# Patient Record
Sex: MALE | Race: WHITE | Employment: OTHER | URBAN - METROPOLITAN AREA
[De-identification: names, ages, dates, MRNs, and addresses within clinical notes are randomized per-mention and may not be internally consistent; named-entity substitution may affect disease eponyms.]

---

## 2017-01-20 ENCOUNTER — HOSPITAL ENCOUNTER (OUTPATIENT)
Dept: ENDOSCOPY | Age: 62
Discharge: HOME | End: 2017-01-20
Attending: FAMILY MEDICINE | Admitting: FAMILY MEDICINE

## 2017-10-13 ENCOUNTER — OFFICE VISIT (OUTPATIENT)
Dept: ORTHOPEDIC SURGERY | Age: 62
End: 2017-10-13

## 2017-10-13 ENCOUNTER — HOSPITAL ENCOUNTER (OUTPATIENT)
Dept: PHYSICAL THERAPY | Age: 62
Discharge: HOME | End: 2017-10-31
Admitting: ORTHOPAEDIC SURGERY

## 2017-10-13 VITALS
DIASTOLIC BLOOD PRESSURE: 79 MMHG | WEIGHT: 248.02 LBS | BODY MASS INDEX: 34.72 KG/M2 | HEART RATE: 75 BPM | HEIGHT: 71 IN | SYSTOLIC BLOOD PRESSURE: 124 MMHG

## 2017-10-13 DIAGNOSIS — M76.32 IT BAND SYNDROME, LEFT: ICD-10-CM

## 2017-10-13 DIAGNOSIS — M17.11 PRIMARY OSTEOARTHRITIS OF RIGHT KNEE: ICD-10-CM

## 2017-10-13 DIAGNOSIS — M21.161 ACQUIRED VARUS DEFORMITY KNEE, RIGHT: ICD-10-CM

## 2017-10-13 DIAGNOSIS — Z96.652 STATUS POST TOTAL LEFT KNEE REPLACEMENT: Primary | ICD-10-CM

## 2017-10-13 PROBLEM — M21.169 ACQUIRED VARUS DEFORMITY KNEE: Status: ACTIVE | Noted: 2017-10-13

## 2017-10-13 PROCEDURE — 73560 X-RAY EXAM OF KNEE 1 OR 2: CPT | Performed by: ORTHOPAEDIC SURGERY

## 2017-10-13 PROCEDURE — 99214 OFFICE O/P EST MOD 30 MIN: CPT | Performed by: ORTHOPAEDIC SURGERY

## 2017-10-13 PROCEDURE — 73564 X-RAY EXAM KNEE 4 OR MORE: CPT | Performed by: ORTHOPAEDIC SURGERY

## 2017-10-13 PROCEDURE — 20610 DRAIN/INJ JOINT/BURSA W/O US: CPT | Performed by: ORTHOPAEDIC SURGERY

## 2017-10-13 RX ORDER — METHYLPREDNISOLONE ACETATE 80 MG/ML
80 INJECTION, SUSPENSION INTRA-ARTICULAR; INTRALESIONAL; INTRAMUSCULAR; SOFT TISSUE ONCE
Status: SHIPPED | OUTPATIENT
Start: 2017-10-13

## 2017-10-13 NOTE — PROGRESS NOTES
Depomedrol     NDC#: 9824-1686-26  Lot: [id number]  Expiration Date: 2/20  Dose: 4cc  Location: injection was given in Left  knee            Lido    NDC#: 8753-9622-55   Lot: -[id number]  Expiration Date: 1/19   Dose: 2cc  Location: injection was given in Left knee

## 2017-10-13 NOTE — PROGRESS NOTES
History of Present Illness:  Garrick Cuenca is a 65 y.o. male    Chief Complaint      Knee Pain (Old patient NP B knee pain Left TKR on 2012 Right ACL sx 15 years ago )  . He comes in today for evaluation of problems of both left and right knee. He notes that he had a hip replacement done on the left side approximately 9 months ago and since that time his left knee has been getting somewhat sore on the outside. He also had assistant arthroscopy done on the right knee about 15 years ago. He says that knee is getting mildly uncomfortable    Brief summary of past history and reason for visit[de-identified]  He's here for evaluation of both left and right knee left knee. He says his little pop, which is uncomfortable, right knee has some pain. He notes his pain as a 7 out of 10 but is actually walking quite comfortably and has limited gait abnormality           Medical History:  Patient's medications, allergies, past medical, surgical, social and family histories were reviewed and updated as appropriate. Social History     Social History    Marital status:      Spouse name: N/A    Number of children: N/A    Years of education: N/A     Occupational History    Not on file. Social History Main Topics    Smoking status: Former Smoker    Smokeless tobacco: Not on file      Comment: quit 14 yrs. ago (25 yr. hx)    Alcohol use Not on file      Comment: occassional    Drug use: Unknown    Sexual activity: Not on file     Other Topics Concern    Not on file     Social History Narrative    No narrative on file       No Known Allergies  Current Outpatient Prescriptions on File Prior to Visit   Medication Sig Dispense Refill    celecoxib (CELEBREX) 200 MG capsule Take 200 mg by mouth daily.  Ascorbic Acid (VITAMIN C) 500 MG tablet Take 500 mg by mouth daily. No current facility-administered medications on file prior to visit. family history is not on file.   Past Medical History: Diagnosis Date    Arthritis     knees    Club Foot     bilat. Past Medical History:   Diagnosis Date    Arthritis     knees    Club Foot     bilat. Active Ambulatory Problems     Diagnosis Date Noted    Osteoarthritis of left knee 04/24/2012    Arthritis     Club Foot     Acquired varus deformity knee 10/13/2017     Resolved Ambulatory Problems     Diagnosis Date Noted    No Resolved Ambulatory Problems     Past Medical History:   Diagnosis Date    Arthritis     Club Foot        Review of Systems:  Salome Costello reported review of systems has been reviewed and has been scanned into his medical record for today's visit. The scanned image can be found in media images folder. He was instructed to contact his primary care physician regarding ROS positives if not already being addressed during today's visit. Height: 5' 10.87\" (180 cm), Weight: 248 lb 0.3 oz (112.5 kg), Body mass index is 34.72 kg/m².,  Relevant review of systems reviewed and available in the patient's chart. All pertinent systems are reviewed and positives noted. ,  Vital Signs:  Vitals:    10/13/17 0958   BP: 124/79   Pulse: 75     Height: 5' 10.87\" (180 cm), Weight: 248 lb 0.3 oz (112.5 kg), Body mass index is 34.72 kg/m². Knee Exam:     Overall alignment is appreciated. Examination today shows bilateral neutral alignment. Left knee is neutral right knee showing early varus pathology            Localization and area of pain:   His left knee pain is along the outer aspect is right knee medial      Effusion ; he has a bilateral effusion of the right knee F2 plus left knee, 1+      Palpation:  Palpation left knee shows IT band discomfort to direct pressure. Right knee has medial tibial femoral crepitation        Range of Motion:    Full range of motion of both knees is appreciated      Ligamentous Stability and meniscus pathology  .   There is no gross instability in either knee, but the right knee has varus

## 2017-10-19 ENCOUNTER — HOSPITAL ENCOUNTER (OUTPATIENT)
Dept: PHYSICAL THERAPY | Age: 62
Discharge: HOME | End: 2017-10-20
Admitting: ORTHOPAEDIC SURGERY

## 2017-10-26 ENCOUNTER — HOSPITAL ENCOUNTER (OUTPATIENT)
Dept: PHYSICAL THERAPY | Age: 62
Discharge: HOME | End: 2017-10-26
Admitting: ORTHOPAEDIC SURGERY

## 2017-10-26 NOTE — FLOWSHEET NOTE
10/19    Posture: WNL   10/19    Gait: (include devices/WB status) Trendelenburg on left; decreased knee flexion on right with ER of LE  10/19                       [x] Patient history, allergies, meds reviewed. Medical chart reviewed. See intake form. 10/19    Review Of Systems (ROS):  [x]Performed Review of systems (Integumentary, CardioPulmonary, Neurological) by intake and observation. Intake form has been scanned into medical record. Patient has been instructed to contact their primary care physician regarding ROS issues if not already being addressed at this time.   10/19      RESTRICTIONS/PRECAUTIONS: L FLORIAN; L TKA    Exercises/Interventions:   Exercise/Equipment Resistance/Repetitions Other comments   Stretching     Hamstring 30 sec x 5  Start 10/19   Towel Pull     Inclined Calf     Hip Flexion     ITB 30 sec x 5 Start 10/19   Groin     Quad 30 sec x 5 Prone; start 10/19   Crossbody stertch 30 sec x 5 Start 10/19   Seated Hip ER 30 sec x 5 Start 10/19        SLR     Supine 3 x 10 Start 10/26   Abduction 3 x 10 Start 10/26   Adduction     Prone     SLR+          Isometrics     Quad sets          Patellar Glides     Medial     Superior     Inferior          ROM     Sheet Pulls     Hang Weights     Passive     Active     Weight Shift     Ankle Pumps                    CKC     Calf raises     Wall sits     Step ups     1 leg stand     Squatting     CC TKE     Balance     bridges     clamshells 3 x 10 Blue band; start 10/26   PRE     Extension 3 x 10  RANGE: start 10/26   Flexion  RANGE:        Quantum machines     Leg press      Leg extension     Leg curl          Manual interventions     Rolling ITB w/ tiger tail 5 min Start 10/26              Therapeutic Exercise and NMR EXR  [x] (39307) Provided verbal/tactile cueing for activities related to strengthening, flexibility, endurance, ROM for improvements in LE, proximal hip, and core control with self care, mobility, lifting, ambulation.  [] (47579) Provided 30 minutes face-to-face)  [] EVAL (HIGH) 12547 (typically 45 minutes face-to-face)  [] RE-EVAL   [x] VD(55963) x  2   [] IONTO  [] NMR (72498) x      [] VASO  [] Manual (48816) x       [] Other:  [] TA x       [] Mech Traction (78004)  [] ES(attended) (68634)      [] ES (un) (82715):     GOALS:   Patient stated goal: Play golf with no pain    Therapist goals for Patient:   Short Term Goals: To be achieved in: 2 weeks  1. Independent in HEP and progression per patient tolerance, in order to prevent re-injury. 2. Patient will have a decrease in pain to facilitate improvement in movement, function, and ADLs as indicated by Functional Deficits. Long Term Goals: To be achieved in: 4-6 weeks  1. Disability index score of 20% or less for the LEFS to assist with reaching prior level of function in 6 weeks. 2. Patient will demonstrate increased AROM to [facility] to allow for proper joint functioning as indicated by patients Functional Deficits in 4 weeks. 3. Patient will demonstrate an increase in Strength to good proximal hip strength and control in LE to allow for proper functional mobility as indicated by patients Functional Deficits in 6 weeks. 4. Patient will return to Independent functional activities without increased symptoms or restriction in 6 weeks. 5. Patient will report no increase in pain with playing golf in 6 weeks. Progression Towards Functional goals:  [x] Patient is progressing as expected towards functional goals listed. [] Progression is slowed due to complexities listed. [] Progression has been slowed due to co-morbidities.   [] Plan just implemented, too soon to assess goals progression  [] Other:     ASSESSMENT:  See eval    Treatment/Activity Tolerance:  [x] Patient tolerated treatment well [] Patient limited by fatique  [] Patient limited by pain  [] Patient limited by other medical complications  [x] Other: 10/26 Patient tolerated treatment well this session with no complaints of pain. Patient presents with increased TTP at distal insertion of ITB with increased muscular tightness noted proximally; good tolerance to tiger tail this session. Continue to progress as tolerated.        Patient education:  10/19 HEP provided and reviewed    Prognosis: [x] Good [] Fair  [] Poor    Patient Requires Follow-up: [x] Yes  [] No    PLAN: See eval  [x] Continue per plan of care [] Alter current plan (see comments)  [x] Plan of care initiated [] Hold pending MD visit [] Discharge    Electronically signed by: Sandra Dukes PT, DPT

## 2017-11-01 ENCOUNTER — HOSPITAL ENCOUNTER (OUTPATIENT)
Dept: PHYSICAL THERAPY | Age: 62
Discharge: HOME | End: 2017-11-30
Attending: ORTHOPAEDIC SURGERY | Admitting: ORTHOPAEDIC SURGERY

## 2017-11-02 ENCOUNTER — HOSPITAL ENCOUNTER (OUTPATIENT)
Dept: PHYSICAL THERAPY | Age: 62
Discharge: HOME | End: 2017-11-02
Admitting: ORTHOPAEDIC SURGERY

## 2017-11-02 NOTE — FLOWSHEET NOTE
lifting, ambulation.  [] (76548) Provided verbal/tactile cueing for activities related to improving balance, coordination, kinesthetic sense, posture, motor skill, proprioception  to assist with LE, proximal hip, and core control in self care, mobility, lifting, ambulation and eccentric single leg control. NMR and Therapeutic Activities:    [] (47968 or 22210) Provided verbal/tactile cueing for activities related to improving balance, coordination, kinesthetic sense, posture, motor skill, proprioception and motor activation to allow for proper function of core, proximal hip and LE with self care and ADLs  [] (74376) Gait Re-education- Provided training and instruction to the patient for proper LE, core and proximal hip recruitment and positioning and eccentric body weight control with ambulation re-education including up and down stairs     Home Exercise Program:    [x] (62977) Reviewed/Progressed HEP activities related to strengthening, flexibility, endurance, ROM of core, proximal hip and LE for functional self-care, mobility, lifting and ambulation/stair navigation   [] (27543)Reviewed/Progressed HEP activities related to improving balance, coordination, kinesthetic sense, posture, motor skill, proprioception of core, proximal hip and LE for self care, mobility, lifting, and ambulation/stair navigation      Manual Treatments:  PROM / STM / Oscillations-Mobs:  G-I, II, III, IV (PA's, Inf., Post.)  [] (49334) Provided manual therapy to mobilize LE, proximal hip and/or LS spine soft tissue/joints for the purpose of modulating pain, promoting relaxation,  increasing ROM, reducing/eliminating soft tissue swelling/inflammation/restriction, improving soft tissue extensibility and allowing for proper ROM for normal function with self care, mobility, lifting and ambulation.      Modalities:      Charges:  Timed Code Treatment Minutes: 45   Total Treatment Minutes: 20  1:06-1:57       [] EVAL (LOW) 75968 (typically 20

## 2017-11-16 ENCOUNTER — HOSPITAL ENCOUNTER (OUTPATIENT)
Dept: PHYSICAL THERAPY | Age: 62
Discharge: HOME | End: 2017-11-16
Admitting: ORTHOPAEDIC SURGERY

## 2017-11-16 NOTE — FLOWSHEET NOTE
[facility] [employer]  Orthopaedics and Sports Rehabilitation, [facility]    Physical Therapy Daily Treatment Note  Date:  2017    Patient Name:  Ashlyn Blue    :  1955  MRN: [id number]  Restrictions/Precautions:    Medical/Treatment Diagnosis Information:  Diagnosis: 76.32 (ICD-10-CM) - It band syndrome, left; Z96.652 (ICD-10-CM) - Status post total left knee replacement; M17.11 (ICD-10-CM) - Primary osteoarthritis of right knee  Treatment Diagnosis: 76.32 (ICD-10-CM) - It band syndrome, left  Insurance/Certification information:  PT Insurance Information: [facility]  Physician Information:  Referring Practitioner: Gerard Tobar  Plan of care signed (Y/N):     Date of Patient follow up with Physician:     G-Code (if applicable):      Date G-Code Applied:         Progress Note: [x]  Yes  []  No  Next due by: Visit #10 or 11/10/17     Latex Allergy:  [x]NO      []YES  Preferred Language for Healthcare:   [x]English       []other:    Visit # Insurance Allowable   4   30     Pain level:  3-4/10   11/16      SUBJECTIVE:   Patient states that he feels about the same.      OBJECTIVE:   Flexibility  10/19 L R Comment   Hamstring 90/90: 45 degrees      Gastroc Moderate restriction Moderate to severe restriction    ITB Tera's: positive Tera's: negative    Quad Moderate restriction Moderate restriction            ROM  10/19 PROM AROM Overpressure Comment    L R L R L R    Flexion   105       Extension   0       Hip IR 25 45        Hip ER  45 45            Strength  10/19 L R Comment   Quad 5 5    Hamstring 5 5    Gastroc 5 5    Hip  flexion 4+ 5    Hip abd 4+ 4+    Hip IR 4+ 5    Hip ER 5 5        Reflexes/Sensation: 10/19   [x]Dermatomes/Myotomes intact    []Reflexes equal and normal bilaterally   []Other:    Joint mobility: 10/19    []Normal    [x]Hypo   []Hyper    Palpation: Tenderness at distal insertion of ITB   10/19    Functional Mobility/Transfers: Independent with ADL's and IADL's with increase pain  10/19    Posture: WNL   10/19    Gait: (include devices/WB status) Trendelenburg on left; decreased knee flexion on right with ER of LE  10/19                       [x] Patient history, allergies, meds reviewed. Medical chart reviewed. See intake form. 10/19    Review Of Systems (ROS):  [x]Performed Review of systems (Integumentary, CardioPulmonary, Neurological) by intake and observation. Intake form has been scanned into medical record. Patient has been instructed to contact their primary care physician regarding ROS issues if not already being addressed at this time.   10/19      RESTRICTIONS/PRECAUTIONS: L FLORIAN; L TKA    Exercises/Interventions:   Exercise/Equipment Resistance/Repetitions Other comments   Stretching     Hamstring 30 sec x 5  Start 10/19   Towel Pull     Inclined Calf     Hip Flexion     ITB 30 sec x 5 Start 10/19   Groin     Quad 30 sec x 5 Prone; start 10/19   Crossbody stretch 30 sec x 5 Start 10/19   Seated Hip ER 30 sec x 5 Start 10/19        SLR     Supine 3 x 10; 1# ^11/16   Abduction 3 x 10; 1# ^11/16   Adduction 3 x 10; 1# ^11/16   Prone 3 x 10; 1# ^11/16   SLR+          Isometrics     Quad sets          Patellar Glides     Medial     Superior     Inferior          ROM     Sheet Pulls     Hang Weights     Passive     Active     Weight Shift     Ankle Pumps                    CKC     Calf raises     Wall sits     Step ups     1 leg stand     Squatting     CC TKE     Balance     bridges     clamshells 3 x 10 silver band; ^11/2   PRE     RANGE: start 10/26   Flexion  RANGE:        Quantum machines     Leg press  3 x 10; 110# Start 11/16   Leg extension 3 x 10; 35# Start 11/16   Leg curl 3 x 10; 35 Start 11/16        Manual interventions     Rolling ITB w/ tiger tail 8 min Start 10/26              Therapeutic Exercise and NMR EXR  [x] (02557) Provided verbal/tactile cueing for activities related to strengthening, flexibility, endurance, ROM for improvements in LE, proximal hip, and core control with self care, mobility, lifting, ambulation.  [] (62940) Provided verbal/tactile cueing for activities related to improving balance, coordination, kinesthetic sense, posture, motor skill, proprioception  to assist with LE, proximal hip, and core control in self care, mobility, lifting, ambulation and eccentric single leg control. NMR and Therapeutic Activities:    [] (87911 or 31354) Provided verbal/tactile cueing for activities related to improving balance, coordination, kinesthetic sense, posture, motor skill, proprioception and motor activation to allow for proper function of core, proximal hip and LE with self care and ADLs  [] (67891) Gait Re-education- Provided training and instruction to the patient for proper LE, core and proximal hip recruitment and positioning and eccentric body weight control with ambulation re-education including up and down stairs     Home Exercise Program:    [x] (47523) Reviewed/Progressed HEP activities related to strengthening, flexibility, endurance, ROM of core, proximal hip and LE for functional self-care, mobility, lifting and ambulation/stair navigation   [] (52106)Reviewed/Progressed HEP activities related to improving balance, coordination, kinesthetic sense, posture, motor skill, proprioception of core, proximal hip and LE for self care, mobility, lifting, and ambulation/stair navigation      Manual Treatments:  PROM / STM / Oscillations-Mobs:  G-I, II, III, IV (PA's, Inf., Post.)  [] (78627) Provided manual therapy to mobilize LE, proximal hip and/or LS spine soft tissue/joints for the purpose of modulating pain, promoting relaxation,  increasing ROM, reducing/eliminating soft tissue swelling/inflammation/restriction, improving soft tissue extensibility and allowing for proper ROM for normal function with self care, mobility, lifting and ambulation.      Modalities:      Charges:  Timed Code Treatment Minutes: 45   Total Treatment Minutes: 55  1:30-2:25 [] EVAL (LOW) 94904 (typically 20 minutes face-to-face)  [] EVAL (MOD) 92044 (typically 30 minutes face-to-face)  [] EVAL (HIGH) 82781 (typically 45 minutes face-to-face)  [] RE-EVAL   [x] NW(18743) x  1   [] IONTO  [] NMR (75447) x      [] VASO  [x] Manual (63855) x  1    [] Other:  [x] TA x  1    [] Mech Traction (77992)  [] ES(attended) (60078)      [] ES (un) (05318):     GOALS:   Patient stated goal: Play golf with no pain    Therapist goals for Patient:   Short Term Goals: To be achieved in: 2 weeks  1. Independent in HEP and progression per patient tolerance, in order to prevent re-injury. 2. Patient will have a decrease in pain to facilitate improvement in movement, function, and ADLs as indicated by Functional Deficits. Long Term Goals: To be achieved in: 4-6 weeks  1. Disability index score of 20% or less for the LEFS to assist with reaching prior level of function in 6 weeks. 2. Patient will demonstrate increased AROM to [facility] to allow for proper joint functioning as indicated by patients Functional Deficits in 4 weeks. 3. Patient will demonstrate an increase in Strength to good proximal hip strength and control in LE to allow for proper functional mobility as indicated by patients Functional Deficits in 6 weeks. 4. Patient will return to Independent functional activities without increased symptoms or restriction in 6 weeks. 5. Patient will report no increase in pain with playing golf in 6 weeks. Progression Towards Functional goals:  [x] Patient is progressing as expected towards functional goals listed. [] Progression is slowed due to complexities listed. [] Progression has been slowed due to co-morbidities.   [] Plan just implemented, too soon to assess goals progression  [] Other:     ASSESSMENT:  See eval    Treatment/Activity Tolerance:  [x] Patient tolerated treatment well [] Patient limited by fatique  [] Patient limited by pain  [] Patient limited by other medical complications  [x] Other: 11/16 Patient tolerated treatment well this session with no reports of pain. Patient reports getting minimal relief with rolling Lateral Thigh. Patient interested in Dn. Will consult MD about potential treatment option.       Patient education:  10/19 HEP provided and reviewed    Prognosis: [x] Good [] Fair  [] Poor    Patient Requires Follow-up: [x] Yes  [] No    PLAN: See eval  [x] Continue per plan of care [] Alter current plan (see comments)  [] Plan of care initiated [] Hold pending MD visit [] Discharge    Electronically signed by: Chang Quezada PT, DPT

## 2017-11-27 ENCOUNTER — OFFICE VISIT (OUTPATIENT)
Dept: ORTHOPEDIC SURGERY | Age: 62
End: 2017-11-27

## 2017-11-27 VITALS
HEART RATE: 75 BPM | SYSTOLIC BLOOD PRESSURE: 118 MMHG | WEIGHT: 248 LBS | BODY MASS INDEX: 35.5 KG/M2 | HEIGHT: 70 IN | DIASTOLIC BLOOD PRESSURE: 81 MMHG

## 2017-11-27 DIAGNOSIS — Z96.649 S/P REVISION OF TOTAL HIP: Primary | ICD-10-CM

## 2017-11-27 DIAGNOSIS — M76.32 IT BAND SYNDROME, LEFT: ICD-10-CM

## 2017-11-27 DIAGNOSIS — Z96.652 STATUS POST TOTAL LEFT KNEE REPLACEMENT: ICD-10-CM

## 2017-11-27 PROCEDURE — G8427 DOCREV CUR MEDS BY ELIG CLIN: HCPCS | Performed by: ORTHOPAEDIC SURGERY

## 2017-11-27 PROCEDURE — 73502 X-RAY EXAM HIP UNI 2-3 VIEWS: CPT | Performed by: ORTHOPAEDIC SURGERY

## 2017-11-27 PROCEDURE — G8417 CALC BMI ABV UP PARAM F/U: HCPCS | Performed by: ORTHOPAEDIC SURGERY

## 2017-11-27 PROCEDURE — 1036F TOBACCO NON-USER: CPT | Performed by: ORTHOPAEDIC SURGERY

## 2017-11-27 PROCEDURE — G8484 FLU IMMUNIZE NO ADMIN: HCPCS | Performed by: ORTHOPAEDIC SURGERY

## 2017-11-27 PROCEDURE — 99213 OFFICE O/P EST LOW 20 MIN: CPT | Performed by: ORTHOPAEDIC SURGERY

## 2017-11-27 PROCEDURE — 3017F COLORECTAL CA SCREEN DOC REV: CPT | Performed by: ORTHOPAEDIC SURGERY

## 2017-11-27 NOTE — PROGRESS NOTES
History of Present Illness:    Garrick Cuenca is a 65 y.o. male  . He comes in today for follow-up evaluation concerning issues associated with his affected left knee. At last visit he was found to have IT band discomfort and an injection was given. He said that significantly help. He's been getting stretching and also massage, exercise and releases in therapy and improving. He notes his pain today is a 4 out of 10 but it's gotten much better  Chief Complaint , a summary of previous treatments, injury, and current reason for the visit:    He did have a left total knee replacement performed approximately 5 years ago. This is doing quite well until he had his hip replaced approximately 5 months ago. Since that time his knee is been sore and he ascribes to his knee issue. He's been told he has an IT band tendinitis, which was injected    Examination:    Examination today confirms some mild discomfort along the left IT band. He has point discomfort lateral epicondylar region and flexion-extension. He points directly to the area. The IT band as it goes over the femoral epicondylar region. He has no instability. He has no swelling. He has no pain. He says it's improved with the last visit at the injection site. He has been doing his physical therapy    Office Procedures:  Orders Placed This Encounter   Procedures    XR HIP LEFT (2-3 VIEWS)         X-ray interpretation:  X-rays:    Two-view x-rays of his left hip. These are performed. Because of his prior left hip replacement, now giving him some IT band pain. Examination the x-ray confirms a well aligned left hip. There is clear evidence of some lateralization of the femoral component, which would be consistent now with IT band tightness and discomfort in the IT band at the knee    Treatment Plan: At this point.   I've explained to him his x-ray findings on his hip along with the discomfort and pain relating to the hip to the knee.    Continued stretching and rehabilitation therapy protocols are discussed. For continued pain and repeat injection. If pain persists after improvement, then surgery may be necessary    Final impresson and disposition: Left IT band tendinopathy, status post left total hip with lateralization of the femoral component. Injection with improvement noted. Repeat injection for continued pain. Ultimately may require an IT band release                      Tamy Harris. Mel Scales M.D. This dictation was performed with a verbal recognition program and it was checked for errors. It is possible that there are still dictated errors within this office note. Any errors should be brought immediately to my attention for correction.   All efforts were made to ensure that this office note is accurate

## 2017-12-01 ENCOUNTER — HOSPITAL ENCOUNTER (OUTPATIENT)
Dept: PHYSICAL THERAPY | Age: 62
Discharge: HOME | End: 2017-12-31
Attending: ORTHOPAEDIC SURGERY | Admitting: ORTHOPAEDIC SURGERY

## 2020-05-19 ENCOUNTER — OFFICE VISIT (OUTPATIENT)
Dept: ORTHOPEDIC SURGERY | Age: 65
End: 2020-05-19
Payer: COMMERCIAL

## 2020-05-19 VITALS
HEIGHT: 70 IN | BODY MASS INDEX: 35.51 KG/M2 | TEMPERATURE: 98.2 F | SYSTOLIC BLOOD PRESSURE: 128 MMHG | HEART RATE: 69 BPM | WEIGHT: 248.02 LBS | DIASTOLIC BLOOD PRESSURE: 74 MMHG

## 2020-05-19 PROCEDURE — 99203 OFFICE O/P NEW LOW 30 MIN: CPT | Performed by: ORTHOPAEDIC SURGERY

## 2020-05-19 PROCEDURE — G8417 CALC BMI ABV UP PARAM F/U: HCPCS | Performed by: ORTHOPAEDIC SURGERY

## 2020-05-19 PROCEDURE — 20610 DRAIN/INJ JOINT/BURSA W/O US: CPT | Performed by: ORTHOPAEDIC SURGERY

## 2020-05-19 PROCEDURE — 1036F TOBACCO NON-USER: CPT | Performed by: ORTHOPAEDIC SURGERY

## 2020-05-19 PROCEDURE — 3017F COLORECTAL CA SCREEN DOC REV: CPT | Performed by: ORTHOPAEDIC SURGERY

## 2020-05-19 PROCEDURE — G8427 DOCREV CUR MEDS BY ELIG CLIN: HCPCS | Performed by: ORTHOPAEDIC SURGERY

## 2020-05-19 RX ORDER — METHYLPREDNISOLONE ACETATE 40 MG/ML
40 INJECTION, SUSPENSION INTRA-ARTICULAR; INTRALESIONAL; INTRAMUSCULAR; SOFT TISSUE ONCE
Status: COMPLETED | OUTPATIENT
Start: 2020-05-19 | End: 2020-05-19

## 2020-05-19 RX ADMIN — METHYLPREDNISOLONE ACETATE 40 MG: 40 INJECTION, SUSPENSION INTRA-ARTICULAR; INTRALESIONAL; INTRAMUSCULAR; SOFT TISSUE at 10:43

## 2020-05-19 ASSESSMENT — ENCOUNTER SYMPTOMS
GASTROINTESTINAL NEGATIVE: 1
ALLERGIC/IMMUNOLOGIC NEGATIVE: 1
EYES NEGATIVE: 1
RESPIRATORY NEGATIVE: 1

## 2020-09-15 ENCOUNTER — OFFICE VISIT (OUTPATIENT)
Dept: ORTHOPEDIC SURGERY | Age: 65
End: 2020-09-15
Payer: COMMERCIAL

## 2020-09-15 VITALS — WEIGHT: 248.02 LBS | HEIGHT: 70 IN | TEMPERATURE: 97.9 F | BODY MASS INDEX: 35.51 KG/M2

## 2020-09-15 PROCEDURE — 3017F COLORECTAL CA SCREEN DOC REV: CPT | Performed by: ORTHOPAEDIC SURGERY

## 2020-09-15 PROCEDURE — 1036F TOBACCO NON-USER: CPT | Performed by: ORTHOPAEDIC SURGERY

## 2020-09-15 PROCEDURE — G8427 DOCREV CUR MEDS BY ELIG CLIN: HCPCS | Performed by: ORTHOPAEDIC SURGERY

## 2020-09-15 PROCEDURE — 99213 OFFICE O/P EST LOW 20 MIN: CPT | Performed by: ORTHOPAEDIC SURGERY

## 2020-09-15 PROCEDURE — 20610 DRAIN/INJ JOINT/BURSA W/O US: CPT | Performed by: ORTHOPAEDIC SURGERY

## 2020-09-15 PROCEDURE — G8417 CALC BMI ABV UP PARAM F/U: HCPCS | Performed by: ORTHOPAEDIC SURGERY

## 2020-09-15 RX ORDER — METHYLPREDNISOLONE ACETATE 40 MG/ML
40 INJECTION, SUSPENSION INTRA-ARTICULAR; INTRALESIONAL; INTRAMUSCULAR; SOFT TISSUE ONCE
Status: COMPLETED | OUTPATIENT
Start: 2020-09-15 | End: 2020-09-15

## 2020-09-15 RX ADMIN — METHYLPREDNISOLONE ACETATE 40 MG: 40 INJECTION, SUSPENSION INTRA-ARTICULAR; INTRALESIONAL; INTRAMUSCULAR; SOFT TISSUE at 11:52

## 2020-09-15 ASSESSMENT — ENCOUNTER SYMPTOMS
COUGH: 0
SHORTNESS OF BREATH: 0

## 2020-09-15 NOTE — PROGRESS NOTES
[facility]  History and Physical  Knee Pain    Date:  9/15/2020    Name:  Justo Aguilar  Address:  [address] [place] [id number]    :  1955      Age:   60 y.o.    SSN:  [id number]      Medical Record Number:  [id number]      Chief Complaint    Follow-up (right knee, requesting cortisone injection )      History of Present Illness:  Justo Aguilar is a 60 y.o. male who presents for follow-up of his right knee. Patient has a history of severe DJD of the right knee which is been managed with injections. He was last seen on 2020 at which point he was given a cortisone injection into the right knee. He got about 4 to 5 months of relief from the injection and is here for repeat. He denies any new injury or progressive symptoms within the knee. He is still taking Celebrex which does seem to help him somewhat. He has been in communication with his ankle surgeon and is likely going for an ankle fusion to set him up for the possibility of a total knee arthroplasty in the future. His walking tolerance is about 35 minutes. The patient denies any new injury. The patient denies any clicking, popping, catching, giving way, joint locking, numbness, paresthesias, or weakness. Pain Assessment  Location of Pain: Knee  Location Modifiers: Right  Severity of Pain: 4  Quality of Pain: Dull, Aching  Duration of Pain: Persistent  Frequency of Pain: Constant  Aggravating Factors: Other (Comment)(n/a)  Relieving Factors: Other (Comment)(n/a)  Result of Injury: No  Work-Related Injury: No  Are there other pain locations you wish to document?: No    Past Medical History:   Diagnosis Date    Arthritis     knees    Club Foot     bilat.         Past Surgical History:   Procedure Laterality Date    FOOT SURGERY      x 5 as child for club feet, bilateral    KNEE ARTHROSCOPY      right ACL repair    KNEE SURGERY      for cyst, right    KNEE SURGERY 04/24/2012     LEFT TOTAL KNEE ARTHROPLASTY                No family history on file. Social History     Socioeconomic History    Marital status:      Spouse name: None    Number of children: None    Years of education: None    Highest education level: None   Occupational History    None   Social Needs    Financial resource strain: None    Food insecurity     Worry: None     Inability: None    Transportation needs     Medical: None     Non-medical: None   Tobacco Use    Smoking status: Former Smoker    Smokeless tobacco: Never Used    Tobacco comment: quit 14 yrs. ago (25 yr. hx)   Substance and Sexual Activity    Alcohol use: None     Comment: occassional    Drug use: None    Sexual activity: None   Lifestyle    Physical activity     Days per week: None     Minutes per session: None    Stress: None   Relationships    Social connections     Talks on phone: None     Gets together: None     Attends Sabianist service: None     Active member of club or organization: None     Attends meetings of clubs or organizations: None     Relationship status: None    Intimate partner violence     Fear of current or ex partner: None     Emotionally abused: None     Physically abused: None     Forced sexual activity: None   Other Topics Concern    None   Social History Narrative    None       Current Outpatient Medications   Medication Sig Dispense Refill    celecoxib (CELEBREX) 200 MG capsule Take 200 mg by mouth daily.  Ascorbic Acid (VITAMIN C) 500 MG tablet Take 500 mg by mouth daily.          Current Facility-Administered Medications   Medication Dose Route Frequency Provider Last Rate Last Dose    methylPREDNISolone acetate (DEPO-MEDROL) injection 80 mg  80 mg Intra-articular Once Deepali Hunt MD           No Known Allergies      Review of Systems:  A 14 point review of systems was completed by the patient and is available in the media section of the scanned medical record and was reviewed on 9/15/2020. The review is negative with the exception of those things mentioned in the HPI and Past Medical History   Review of Systems   Constitutional: Negative for activity change and fever. Respiratory: Negative for cough and shortness of breath. Vital Signs:   Temp 97.9 °F (36.6 °C) (Infrared)   Ht 5' 10\" (1.778 m)   Wt 248 lb 0.3 oz (112.5 kg)   BMI 35.59 kg/m²     General Exam:    General: Francois Alex is a healthy and well appearing 60y.o. year old male who is sitting comfortably in our office in no acute distress. Neuro: Alert & Oriented x 3,  normal,  no focal deficits noted. Normal mood & affect. Eyes: Sclera clear  Ears: Normal external ear  Mouth:  No perioral lesions  Pulm: Respirations unlabored and regular   Skin: Warm, well perfused      Knee Examination: Right    Inspection: Obvious clubfoot deformity with severe ankle osteoarthritis. No effusion, ecchymosis, discoloration, erythema, excessive warmth. no gross deformities, no signs of infection. Palpation: There is mild patellofemoral crepitus, there is moderate medial and lateral joint line tenderness. No other osseous or soft tissue tenderness around the knee. Negative calf tenderness    Range of Motion:  0-100 degrees without pain or difficulty    Strength:  5/5 quadriceps, 5/5 hamstrings    Special Tests:  No ligament instability, valgus and varus stress test are stable at 0 and 30°. Lachman's exhibits a solid endpoint. Negative posterior drawer.   Negative Homans sign    Gait: Slow, limping gait without assistive device    Alignment: Mild varus deformity    Neuro: Sensation equal bilateral lower extremities    Vascular: 2+ posterior tibialis pulse    Radiology:     No new imaging obtained today      Office Procedures:  Orders Placed This Encounter   Procedures    AR ARTHROCENTESIS ASPIR&/INJ MAJOR JT/BURSA W/O US            Assessment: Patient is a 60 y.o. male severe tricompartmental DJD of the right knee    Visit Diagnoses       Codes    Right knee pain, unspecified chronicity    -  Primary M25.561    Localized osteoarthritis of right knee     M17.11          Orders Placed This Encounter   Medications    methylPREDNISolone acetate (DEPO-MEDROL) injection 40 mg         Treatment Plan:    Matt Hussein presented today for repeat cortisone injection for the right knee. He is managing his symptoms well with injections and Celebrex. He is continuing to golf and maintain his activity levels. All of his questions were answered today and he agreed with the plan moving forward    After informed consent was provided, the patient was seated on the exam table with the right knee flexed to 90 degrees. The anterolateral aspect of the knee adjacent to the joint line was prepped with Chlora-prep. The skin and subcutaneous tissues were anesthetized with ethyl chloride spray. A 22-gauge needle was inserted into the right knee and 2 ml of 40 mg/ml DepoMedrol was injected. The needle was withdrawn and the puncture site sealed with a Band-Aid. The patient tolerated the procedure well. Post injection instructions and precautions given and any problems to notify us. Follow up: As needed      Approximately 30 minutes was spent on patient education and coordinating care. This patient was not scheduled for steroid injection did require examination and evaluation to be sure this is the correct treatment course which was performed    Enrique [place], [address] [place] [address]     09/15/20  2:07 PM      This dictation was performed with a verbal recognition program [facility] ) and it was checked for errors. It is possible that there are still dictated errors within this office note. If so, please bring any errors to my attention for an addendum. All efforts were made to ensure that this office note is accurate.     This dictation was performed with a verbal recognition program (DRAGON) and it was checked for errors. It is possible that there are still dictated errors within this office note. If so, please bring any errors to my attention for an addendum. All efforts were made to ensure that this office note is accurate. Supervising Physician Attestation:  I, Dr. Hilda Justice, personally performed the services described in this documentation as scribed above, and it is both accurate and complete and I agree with all pertinent clinical information. I personally interviewed the patient and performed a physical examination and medical decision making. I discussed the patient's condition and treatment options and have  reviewed and agree with the past medical, family and social history unless otherwise noted. All of the patient's questions were answered.       Board Certified Orthopaedic Surgeon  [facility] and [address] [place] [address]  [facility] and [address] and [employer]  Professor of [address]

## 2021-06-01 ENCOUNTER — OFFICE VISIT (OUTPATIENT)
Dept: ORTHOPEDIC SURGERY | Age: 66
End: 2021-06-01
Payer: COMMERCIAL

## 2021-06-01 VITALS — WEIGHT: 230 LBS | BODY MASS INDEX: 32.2 KG/M2 | HEIGHT: 71 IN

## 2021-06-01 DIAGNOSIS — M25.561 RIGHT KNEE PAIN, UNSPECIFIED CHRONICITY: ICD-10-CM

## 2021-06-01 DIAGNOSIS — M17.11 LOCALIZED OSTEOARTHRITIS OF RIGHT KNEE: Primary | ICD-10-CM

## 2021-06-01 PROCEDURE — 3017F COLORECTAL CA SCREEN DOC REV: CPT | Performed by: ORTHOPAEDIC SURGERY

## 2021-06-01 PROCEDURE — G8427 DOCREV CUR MEDS BY ELIG CLIN: HCPCS | Performed by: ORTHOPAEDIC SURGERY

## 2021-06-01 PROCEDURE — 99213 OFFICE O/P EST LOW 20 MIN: CPT | Performed by: ORTHOPAEDIC SURGERY

## 2021-06-01 PROCEDURE — 1036F TOBACCO NON-USER: CPT | Performed by: ORTHOPAEDIC SURGERY

## 2021-06-01 PROCEDURE — 20610 DRAIN/INJ JOINT/BURSA W/O US: CPT | Performed by: ORTHOPAEDIC SURGERY

## 2021-06-01 PROCEDURE — G8417 CALC BMI ABV UP PARAM F/U: HCPCS | Performed by: ORTHOPAEDIC SURGERY

## 2021-06-01 RX ORDER — METHYLPREDNISOLONE ACETATE 40 MG/ML
40 INJECTION, SUSPENSION INTRA-ARTICULAR; INTRALESIONAL; INTRAMUSCULAR; SOFT TISSUE ONCE
Status: COMPLETED | OUTPATIENT
Start: 2021-06-01 | End: 2021-06-01

## 2021-06-01 RX ADMIN — METHYLPREDNISOLONE ACETATE 40 MG: 40 INJECTION, SUSPENSION INTRA-ARTICULAR; INTRALESIONAL; INTRAMUSCULAR; SOFT TISSUE at 14:23

## 2021-06-01 NOTE — PROGRESS NOTES
Review of Systems   HENT:        Eye or hearing impairment   Musculoskeletal:        Fractures or other injuries   All other systems reviewed and are negative.

## 2021-06-01 NOTE — PROGRESS NOTES
Chief Complaint  knee npain rt    History of Present Illness:  Mahnaz Vallejo is a 60 y.o. male who presents for follow up of right knee(s). Pt is here to follow up with on his right knee pain. Pt states he played pickleball yesterday for the first time and irritated his right knee. Pt has a history of R severe OA that we have been managing conservatively with cortizone injections in the past with the most recent one in 9/15/2020. Pt states overall they help for quite a while, but knows he will eventually need a TKA. He had a L TKA done by Dr. Alyse Rivera in 2012. He also has severe OA in his right ankle that will require a fusion soon. Pain Assessment:  Location: right  Level: 6 out of 10  Duration: Days  Description: achy  Result of an injury: Yes  Work related injury: No  Aggravating actions: walking  Relieving Factors: rest  Wants injections: Yes     Past Medical History:   Diagnosis Date    Arthritis     knees    Club Foot     bilat. Past Surgical History:   Procedure Laterality Date    FOOT SURGERY      x 5 as child for club feet, bilateral    KNEE ARTHROSCOPY      right ACL repair    KNEE SURGERY      for cyst, right    KNEE SURGERY  04/24/2012     LEFT TOTAL KNEE ARTHROPLASTY                No family history on file. Social History     Socioeconomic History    Marital status:      Spouse name: None    Number of children: None    Years of education: None    Highest education level: None   Occupational History    None   Tobacco Use    Smoking status: Former Smoker    Smokeless tobacco: Never Used    Tobacco comment: quit 14 yrs.  ago (25 yr. hx)   Substance and Sexual Activity    Alcohol use: None     Comment: occassional    Drug use: None    Sexual activity: None   Other Topics Concern    None   Social History Narrative    None     Social Determinants of Health     Financial Resource Strain:     Difficulty of Paying Living Expenses:    Food Insecurity:     Worried About Running Out of Food in the Last Year:     [place] of Food in the Last Year:    Transportation Needs:     Lack of Transportation (Medical):  Lack of Transportation (Non-Medical):    Physical Activity:     Days of Exercise per Week:     Minutes of Exercise per Session:    Stress:     Feeling of Stress :    Social Connections:     Frequency of Communication with Friends and Family:     Frequency of Social Gatherings with Friends and Family:     Attends Uatsdin Services:     Active Member of Clubs or Organizations:     Attends Club or Organization Meetings:     Marital Status:    Intimate Partner Violence:     Fear of Current or Ex-Partner:     Emotionally Abused:     Physically Abused:     Sexually Abused:        Current Outpatient Medications   Medication Sig Dispense Refill    celecoxib (CELEBREX) 200 MG capsule Take 200 mg by mouth daily.  Ascorbic Acid (VITAMIN C) 500 MG tablet Take 500 mg by mouth daily. Current Facility-Administered Medications   Medication Dose Route Frequency Provider Last Rate Last Admin    methylPREDNISolone acetate (DEPO-MEDROL) injection 80 mg  80 mg Intra-articular Once Skye Loera MD           No Known Allergies    Review of Systems:  A 14 point review of systems was completed by the patient and is available in the media section of the scanned medical record and was reviewed on 6/1/2021. The review is negative with the exception of those things mentioned in the HPI and Past Medical History     Vital Signs:   Ht 5' 11\" (1.803 m)   Wt 230 lb (104.3 kg)   BMI 32.08 kg/m²     General Exam:   Mental Status: The patient is oriented to time, place and person. The patient's mood and affect are appropriate. Neurological: The patient has good coordination. There is no weakness or sensory deficit. Knee Examination: Right    Skin:  There are no skin lesions, cellulitis, or extreme edema in the lower extremities.  Sensation is grossly intact to light touch bilaterally lower extremity. The patient has warm and well-perfused Bilateral lower extremities with brisk capillary refill. Inspection:  The is mild edema, no gross deformities, and no signs of infection. Palpation: There is patellofemoral crepitus, there is significant tenderness over the knee Lateral joint line    Range of Motion:  0 to 120  and grossly intact with pain and/or difficulty    Strength: Motor is grossly intact    Special Tests: There is no ligament instability. Grinding/Crepitus (+)    Gait: antalgic  without the use of assistive devices    Alignment: varus    Radiology:     None      Office Procedures:  No orders of the defined types were placed in this encounter. Assessment: Ty Posada is a 60 y.o. male who presents for follow up of right knee(s). Severe R Knee OA    Patient's workup and evaluation were reviewed with the patient in the office today. Imaging was also reviewed with the patient in the clinic today. Given the patient's history and physical exam we will proceed with another cortizone injection as they have had good success in the past.     This patient was not scheduled for steroid injection but did require a review of his history and symptoms, examination, review of radiographs, prior treatment and following this the recommendation was made    After informed consent was provided, the patient was seated on the exam table with the right knee(s) flexed to 90 degrees. The anterolateral aspect of the knee adjacent to the joint line was prepped with Chlora-prep. The skin and subcutaneous tissues were anesthetized with ethyl chloride spray. A 20-gauge needle was inserted into the right knee(s) and 2 ml of 40 mg/ml DepoMedrol was injected. The needle was withdrawn and the puncture site sealed with a Band-Aid. The patient tolerated the procedure well. Post injection instructions given and any problems to notify us. Treatment Plan:    1.  Activity modification and rest  2. Ice 20 minutes every 1-2 hours PRN  3. NSAIDs PRN  4. Elevation at least 2 inches above the heart with pillows supporting the joint underneath for swelling  5. Home exercises to maintain strength and range of motion  6. Appropriate diet/weight management      Diagnoses and treatments were reviewed with the patient today. Patient education material was provided. Questions were entertained and answered to the patient's satisfaction. Follow up: PRN      I, Sj Plasencia ATC am scribing for and in the presence of Dr. Zahraa Fabian. The physical examination was performed by Dr. Zahraa Fabian. All counseling during the appointment was performed between the patient and Dr. Zahraa Fabian. 06/01/21 12:36 PM   Jenn Farrell MS ATC    This patient exhibits moderate complexity for obtaining an independent history, reviewing past medical records, independent interpretation of diagnostic imaging, and further coordinating care. The patient exhibits moderate risk. Approximately 12 minutes were spent reviewing past medical records, imaging, educating the patient, and coordinating care. This note was created using voice recognition software. It has been proofread, but occasionally errors remain. Please disregard these errors. They will be corrected as they are noted. This dictation was performed with a verbal recognition program (DRAGON) and it was checked for errors. It is possible that there are still dictated errors within this office note. If so, please bring any errors to my attention for an addendum. All efforts were made to ensure that this office note is accurate.     Supervising Physician Attestation:  I, Dr. Zahraa Fabian, personally performed the services described in this documentation as scribed above, and it is both accurate and complete and I agree with all pertinent clinical information. I personally interviewed the patient and performed a physical examination and medical decision making. I discussed the patient's condition and treatment options and have  reviewed and agree with the past medical, family and social history unless otherwise noted. All of the patient's questions were answered.       Board Certified Orthopaedic Surgeon  [facility] and [address] and [address] and [employer]  Professor of [address]